# Patient Record
Sex: FEMALE | URBAN - METROPOLITAN AREA
[De-identification: names, ages, dates, MRNs, and addresses within clinical notes are randomized per-mention and may not be internally consistent; named-entity substitution may affect disease eponyms.]

---

## 2021-04-07 ENCOUNTER — OFFICE VISIT (OUTPATIENT)
Dept: URBAN - METROPOLITAN AREA CLINIC 44 | Facility: CLINIC | Age: 76
End: 2021-04-07
Payer: MEDICARE

## 2021-04-07 DIAGNOSIS — H25.13 AGE-RELATED NUCLEAR CATARACT, BILATERAL: Primary | ICD-10-CM

## 2021-04-07 DIAGNOSIS — H04.123 TEAR FILM INSUFFICIENCY OF BILATERAL LACRIMAL GLANDS: ICD-10-CM

## 2021-04-07 DIAGNOSIS — H43.813 VITREOUS DEGENERATION, BILATERAL: ICD-10-CM

## 2021-04-07 DIAGNOSIS — H52.4 PRESBYOPIA: ICD-10-CM

## 2021-04-07 PROCEDURE — 92004 COMPRE OPH EXAM NEW PT 1/>: CPT | Performed by: OPTOMETRIST

## 2021-04-07 ASSESSMENT — VISUAL ACUITY
OD: 20/20
OS: 20/20

## 2021-04-07 ASSESSMENT — KERATOMETRY
OD: 41.50
OS: 41.63

## 2021-04-07 ASSESSMENT — INTRAOCULAR PRESSURE
OS: 15
OD: 16

## 2021-04-07 NOTE — IMPRESSION/PLAN
Impression: Vitreous degeneration, bilateral: H43.813. Patient states vision is stable with no new floaters, flashes  or veiling. Reports they see occasional floaters which are not interfering with daily activities and vision.  Plan: PLAN: RTC if experiences increase in floaters, flashes or veiling

## 2021-04-07 NOTE — IMPRESSION/PLAN
Impression: Age-related nuclear cataract, bilateral: H25.13. Visually non-significant/asymptomatic  bilateral cataracts.  Plan: RTC 12 months for DFE/comprehensive eye exam

## 2021-04-07 NOTE — IMPRESSION/PLAN
Impression: Tear film insufficiency of bilateral lacrimal glands: H04.123. 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 Plan: PLAN: Warm compresses for 10 minutes AM and PM. Systane Complete or equal to be used 3-4 X daily. Rec. 2000 mg Triglyceride based fish oil (Example: Coromega, PRN) to be taken daily. RTC if symptom's worsen otherwise follow up in 12 months (Handout given to patient).